# Patient Record
Sex: MALE | Race: WHITE | NOT HISPANIC OR LATINO | Employment: OTHER | URBAN - METROPOLITAN AREA
[De-identification: names, ages, dates, MRNs, and addresses within clinical notes are randomized per-mention and may not be internally consistent; named-entity substitution may affect disease eponyms.]

---

## 2021-04-06 ENCOUNTER — TELEPHONE (OUTPATIENT)
Dept: OTHER | Facility: OTHER | Age: 71
End: 2021-04-06

## 2021-04-08 ENCOUNTER — NURSING HOME VISIT (OUTPATIENT)
Dept: GERIATRICS | Facility: OTHER | Age: 71
End: 2021-04-08
Payer: COMMERCIAL

## 2021-04-08 DIAGNOSIS — R13.12 OROPHARYNGEAL DYSPHAGIA: ICD-10-CM

## 2021-04-08 DIAGNOSIS — N17.9 AKI (ACUTE KIDNEY INJURY) (HCC): ICD-10-CM

## 2021-04-08 DIAGNOSIS — E03.9 ACQUIRED HYPOTHYROIDISM: ICD-10-CM

## 2021-04-08 DIAGNOSIS — R29.6 RECURRENT FALLS: ICD-10-CM

## 2021-04-08 DIAGNOSIS — N13.8 BENIGN PROSTATIC HYPERPLASIA WITH URINARY OBSTRUCTION: ICD-10-CM

## 2021-04-08 DIAGNOSIS — R53.81 DEBILITY: ICD-10-CM

## 2021-04-08 DIAGNOSIS — N40.1 BENIGN PROSTATIC HYPERPLASIA WITH URINARY OBSTRUCTION: ICD-10-CM

## 2021-04-08 DIAGNOSIS — G20 PARKINSON'S DISEASE (HCC): ICD-10-CM

## 2021-04-08 DIAGNOSIS — K59.00 CONSTIPATION, UNSPECIFIED CONSTIPATION TYPE: ICD-10-CM

## 2021-04-08 DIAGNOSIS — D62 ACUTE BLOOD LOSS ANEMIA: ICD-10-CM

## 2021-04-08 DIAGNOSIS — S72.141D CLOSED DISPLACED INTERTROCHANTERIC FRACTURE OF RIGHT FEMUR WITH ROUTINE HEALING, SUBSEQUENT ENCOUNTER: Primary | ICD-10-CM

## 2021-04-08 PROCEDURE — 99306 1ST NF CARE HIGH MDM 50: CPT | Performed by: FAMILY MEDICINE

## 2021-04-08 NOTE — PROGRESS NOTES
[facility]   [address] [facility], [facility], [address]  History and Physical  POS: SNF-31    Records Reviewed include: [facility] records  Unable to obtain from patient due to: History obtained from patient; additional history obtained speaking with nursing/nursing note review along with medical record review    Chief Complaint/ Reason for Admission: Right femur fracture s/p surgical repair, PATTI, Parkinson disease, ABLA    History of Present Illness:            65year old male admitted for SNF rehab following hospitalization at [facility], [facility]  Presented following a fall with subsequent right femur fracture; underwent surgical repair per orthopedics on 3/28/21 (TFN)  Postoperative course significant for acute blood loss anemia requiring PRBC transfusion along with PATTI- in setting of urinary retention due to obstructive uropathy secondary to BPH; mckeon placed inpatient, patient is on tamsulosin  Had CKD2 with baseline creatinine of 0 9-1 1; see lab trends below  Continues with hip pain, exacerbated with ambulation and movement; relieved with rest and PRN acetaminophen  Additional active medical issues include Parkinson disease, on multiple medications (see updated med list in Epic), with associated dysphagia        Allergies  No Known Allergies    Past Medical History  Past Medical History:   Diagnosis Date    Arthritis     Disease of thyroid gland     Hyperlipidemia     Hypertension     Parkinson disease ([address] )       CHF baseline EF:N/A  CKD: Stage 2, baseline creatinine 0 9-1 1    Past Surgical History:   Procedure Laterality Date    CHOLECYSTECTOMY      FRACTURE SURGERY      INGUINAL HERNIA REPAIR Bilateral     TONSILLECTOMY      TOTAL KNEE ARTHROPLASTY Left        Family History  Family History   Problem Relation Age of Onset    Breast cancer Mother     Heart disease Mother     Lung cancer Father     Thyroid disease Brother        Social History  Social History Tobacco Use   Smoking Status Never Smoker   Smokeless Tobacco Never Used      Social History     Substance and Sexual Activity   Alcohol Use Not Currently      Social History     Substance and Sexual Activity   Drug Use Not on file        Lives: Home, with spouse,  Social Support: Family  Education Level:  Occupation:  Fall in the past 12 months: Yes, multiple  Use of assistance Device: Walker    Physical Exam    Weight: 152lb Temp:97 9F BP: 116/68(85/43- 125/81) Pulse:84 Resp:16 O2 Sat:91%RA  Constitutional: Well-nourished and Normocephalic  Orientation:Person and Place, situation     Physical Exam  Vitals signs and nursing note reviewed  Constitutional:       General: He is awake  He is not in acute distress  Appearance: He is well-developed and well-groomed  He is not ill-appearing, toxic-appearing or diaphoretic  HENT:      Head: Normocephalic and atraumatic  Right Ear: External ear normal       Left Ear: External ear normal       Nose: No rhinorrhea  Mouth/Throat:      Mouth: Mucous membranes are moist       Pharynx: Oropharynx is clear  Eyes:      General: No scleral icterus  Right eye: No discharge  Left eye: No discharge  Conjunctiva/sclera: Conjunctivae normal    Neck:      Musculoskeletal: Neck supple  No neck rigidity  Cardiovascular:      Rate and Rhythm: Normal rate  Heart sounds: S1 normal and S2 normal    Pulmonary:      Effort: Pulmonary effort is normal  No respiratory distress  Breath sounds: No wheezing  Abdominal:      General: Bowel sounds are normal  There is no distension  Palpations: Abdomen is soft  Tenderness: There is no abdominal tenderness  Genitourinary:     Comments: Mckeon with yellow urine noted in mckeon bag  Musculoskeletal:         General: No deformity  Right lower leg: Edema (trace pedal) present  Left lower leg: Edema (trace pedal) present  Skin:     General: Skin is warm and dry        Coloration: Skin is pale  Skin is not jaundiced  Neurological:      General: No focal deficit present  Mental Status: He is alert  Cranial Nerves: Cranial nerves are intact  Motor: Abnormal muscle tone present  No tremor  Comments: Bradykinesia  Noted to have stuttering with appropriate responses given time to compensate and communicate   Psychiatric:         Attention and Perception: Attention and perception normal          Mood and Affect: Mood and affect normal          Behavior: Behavior is cooperative  Review of Systems:  Review of Systems   Constitutional: Negative for chills and fever  Respiratory: Negative for cough and shortness of breath  Cardiovascular: Negative for leg swelling  Gastrointestinal: Negative for abdominal pain  Genitourinary: Negative for hematuria  Musculoskeletal: Positive for arthralgias  Neurological: Negative for dizziness and light-headedness  All other systems reviewed and are negative        List of Current Medications: Medication list reviewed and updated in Epic to reflect most current SNF orders    Allergies  No Known Allergies    Labs/Diagnostics (reviewed by this provider): Hospital Paperwork and Old Records  CBC: Hb:9 0 Hct:25 8 WBC:3 6 PLT:201  CMP: Na:140 K:4 3 Cl:105 CO:30 BUN:26 Cr:1 00 (1 71) Glu:94 Ca:8 5   AST:45 ALT:18 Alk-P:76 Tprotein:6 2 Albumin:3 0   Tbili:0 7 Ma 6 Phos:4 3  Cardiac: Troponin:<0 02    Microbiology:  Urine Cx: (3/30/21)- no growth    Imaging Reviewed:  EKG: (3/28/21)- sinus rhythm with PACs  CT Scan: Head/Cervical spine (3/27/21)- advanced cervical degenerative changes; no fracture; no acute intracranial pathology  Other: Ultrasound b/l kidneys (3/29/21)- no hydronephrosis; right renal midpole and lower pole cysts  Xray R femur (3/27/21)- comminuted intertrochanteric fracture of right proximal femur    Assessment/Plan:  65year old male with:    Closed intertrochanteric fracture of right femur with routine healing  S/p TFN on 3/28/21  In setting of immobility, restart lovenox for DVT ppx 40mg SQ daily through 4/26  Start scheduled acetaminophen 650mg TID for pain; max 3g/24h  Consider topical agent, OxyIR 2 5mg Q6h PRN severe pain or prior to therapy if pain not well controlled on max scheduled acetaminophen  Follow up with orthopedics as scheduled    PATTI (acute [facility]) ([facility] )  In setting of obstructive uropathy, along with ABLA in perioperative setting- management of both as outlined  D/c naproxen; avoid NSAIDS, nephrotoxic agents  Start daily weights/PATTI pathway  BMP ordered for 4/9    Acute blood loss anemia  In setting of right femur fracture s/p surgical repair  CBC ordered for 4/9    Benign prostatic hyperplasia with urinary obstruction  Yun placed in patient, Dx: obstructive uropathy  Continue tamsulosin- monitor BP closely, avoid hypotension  Follow up with urology- needs appointment scheduled routine outpatient  Plan for voiding trial when mobility increased, constipation treated    Parkinson's disease ([facility] )  Continue rytary, amantadine, ropinirole, exelon patch (with associated cognitive impairment)  Follow up with neurology routine outpatient    Oropharyngeal dysphagia  In setting of Parkinson disease  Modified diet  Aspiration precautions  Speech therapy consult    Acquired hypothyroidism  Continue levothyroxine    Constipation  In setting of immobility  Ensure adequate hydration  Continue senna-docusate along with miralax; hold for loose stool  Constipation may be contributing to urinary retention, see above    Recurrent falls       Debility  Multifactorial  Admit to SNF for rehab  PT/OT consult- evaluate and treat  Supportive care, nutritional support, ADL support  Fall precautions  Management of acute and chronic medical conditions as outlined    Pain: Present yes- right hip with movement; see HPI  Rehab Potential:Fair  Patient Informed of Medical Condition: yes  Patient is Capable of Understanding Their Right: yes  Prognosis:Good  Discharge Plan: STR-> Home w/wife  Surrogate Decision Maker: Wife  Advanced Directives: Yes  Code status:DNR (Per discussion with resident or POA)  Code status order updated to DNR/DNI while at SNF    PCP: Robson Erickson DO    Immunization History   Administered Date(s) Administered    INFLUENZA 10/01/2012, 10/14/2013, 02/03/2015, 12/07/2015, 01/05/2016, 09/15/2020    Influenza Split High Dose Preservative Free IM 10/12/2016, 10/24/2017, 10/26/2018, 10/29/2019    Pneumococcal Conjugate 13-Valent 10/12/2016    Pneumococcal Polysaccharide PPV23 10/14/2013    SARS-CoV-2 / COVID-19 mRNA IM (Moderna) 03/10/2021    Td (adult), Unspecified 01/19/2000    Tdap 02/03/2015    Zoster 11/06/2017    influenza, trivalent, adjuvanted 09/15/2020       Pam Penaloza DO  4/8/21

## 2021-04-09 PROBLEM — S72.141D CLOSED INTERTROCHANTERIC FRACTURE OF RIGHT FEMUR WITH ROUTINE HEALING: Status: ACTIVE | Noted: 2021-03-27

## 2021-04-09 PROBLEM — R26.2 AMBULATORY DYSFUNCTION: Status: ACTIVE | Noted: 2018-06-11

## 2021-04-09 PROBLEM — E78.2 MIXED HYPERLIPIDEMIA: Status: ACTIVE | Noted: 2021-04-09

## 2021-04-09 PROBLEM — Z96.652 STATUS POST TOTAL LEFT KNEE REPLACEMENT: Status: ACTIVE | Noted: 2018-06-12

## 2021-04-09 PROBLEM — S72.141G: Status: ACTIVE | Noted: 2021-03-27

## 2021-04-09 PROBLEM — K59.00 CONSTIPATION: Status: ACTIVE | Noted: 2021-04-09

## 2021-04-09 PROBLEM — R29.6 FALLS FREQUENTLY: Status: ACTIVE | Noted: 2020-06-10

## 2021-04-09 PROBLEM — H90.6 MIXED CONDUCTIVE AND SENSORINEURAL HEARING LOSS OF BOTH EARS: Status: ACTIVE | Noted: 2018-10-26

## 2021-04-09 PROBLEM — G31.84 MCI (MILD COGNITIVE IMPAIRMENT) WITH MEMORY LOSS: Status: ACTIVE | Noted: 2018-10-09

## 2021-04-09 PROBLEM — R53.81 DEBILITY: Status: ACTIVE | Noted: 2021-04-09

## 2021-04-09 PROBLEM — D62 ACUTE BLOOD LOSS ANEMIA: Status: ACTIVE | Noted: 2021-04-09

## 2021-04-09 PROBLEM — N17.9 AKI (ACUTE KIDNEY INJURY) (HCC): Status: ACTIVE | Noted: 2021-04-09

## 2021-04-09 RX ORDER — RIVASTIGMINE 4.6 MG/24H
1 PATCH, EXTENDED RELEASE TRANSDERMAL DAILY
COMMUNITY
Start: 2020-10-07 | End: 2021-10-07

## 2021-04-09 RX ORDER — LEVODOPA AND CARBIDOPA 145; 36.25 MG/1; MG/1
3 CAPSULE, EXTENDED RELEASE ORAL 4 TIMES DAILY
COMMUNITY
Start: 2021-01-26

## 2021-04-09 RX ORDER — TAMSULOSIN HYDROCHLORIDE 0.4 MG/1
0.8 CAPSULE ORAL EVERY EVENING
COMMUNITY
Start: 2021-04-05

## 2021-04-09 RX ORDER — AMANTADINE HYDROCHLORIDE 100 MG/1
100 CAPSULE, GELATIN COATED ORAL DAILY
COMMUNITY
Start: 2021-01-26

## 2021-04-09 RX ORDER — POLYETHYLENE GLYCOL 3350 17 G/17G
17 POWDER, FOR SOLUTION ORAL DAILY PRN
COMMUNITY
Start: 2021-04-07 | End: 2025-04-09

## 2021-04-09 RX ORDER — SENNOSIDES 8.6 MG
650 CAPSULE ORAL 3 TIMES DAILY
COMMUNITY

## 2021-04-09 RX ORDER — ASPIRIN 81 MG/1
81 TABLET ORAL DAILY
COMMUNITY
End: 2021-04-23

## 2021-04-09 RX ORDER — AMOXICILLIN 250 MG
1 CAPSULE ORAL 2 TIMES DAILY
COMMUNITY
Start: 2021-04-06 | End: 2021-04-20 | Stop reason: ALTCHOICE

## 2021-04-09 RX ORDER — LEVOTHYROXINE SODIUM 0.15 MG/1
150 TABLET ORAL DAILY
COMMUNITY
Start: 2021-04-07 | End: 2022-04-07

## 2021-04-09 RX ORDER — ROPINIROLE 4 MG/1
4 TABLET, FILM COATED ORAL 3 TIMES DAILY
COMMUNITY
Start: 2021-04-06 | End: 2022-04-06

## 2021-04-09 RX ORDER — PRAVASTATIN SODIUM 20 MG
20 TABLET ORAL DAILY
COMMUNITY

## 2021-04-09 NOTE — ASSESSMENT & PLAN NOTE
Yun placed in patient, Dx: obstructive uropathy  Continue tamsulosin- monitor BP closely, avoid hypotension  Follow up with urology- needs appointment scheduled routine outpatient  Plan for voiding trial when mobility increased, constipation treated 78

## 2021-04-09 NOTE — ASSESSMENT & PLAN NOTE
S/p TFN on 3/28/21  In setting of immobility, restart lovenox for DVT ppx 40mg SQ daily through 4/26  Start scheduled acetaminophen 650mg TID for pain; max 3g/24h  Consider topical agent, OxyIR 2 5mg Q6h PRN severe pain or prior to therapy if pain not well controlled on max scheduled acetaminophen  Follow up with orthopedics as scheduled

## 2021-04-09 NOTE — ASSESSMENT & PLAN NOTE
Continue rytary, amantadine, ropinirole, exelon patch (with associated cognitive impairment)  Follow up with neurology routine outpatient

## 2021-04-09 NOTE — ASSESSMENT & PLAN NOTE
In setting of obstructive uropathy, along with ABLA in perioperative setting- management of both as outlined  D/c naproxen; avoid NSAIDS, nephrotoxic agents  Start daily weights/PATTI pathway  BMP ordered for 4/9

## 2021-04-09 NOTE — ASSESSMENT & PLAN NOTE
In setting of immobility  Ensure adequate hydration  Continue senna-docusate along with miralax; hold for loose stool  Constipation may be contributing to urinary retention, see above

## 2021-04-12 ENCOUNTER — NURSING HOME VISIT (OUTPATIENT)
Dept: GERIATRICS | Facility: OTHER | Age: 71
End: 2021-04-12
Payer: COMMERCIAL

## 2021-04-12 DIAGNOSIS — N17.9 AKI (ACUTE KIDNEY INJURY) (HCC): ICD-10-CM

## 2021-04-12 DIAGNOSIS — N13.8 BENIGN PROSTATIC HYPERPLASIA WITH URINARY OBSTRUCTION: ICD-10-CM

## 2021-04-12 DIAGNOSIS — K59.00 CONSTIPATION, UNSPECIFIED CONSTIPATION TYPE: ICD-10-CM

## 2021-04-12 DIAGNOSIS — S72.141D CLOSED DISPLACED INTERTROCHANTERIC FRACTURE OF RIGHT FEMUR WITH ROUTINE HEALING, SUBSEQUENT ENCOUNTER: Primary | ICD-10-CM

## 2021-04-12 DIAGNOSIS — R53.81 DEBILITY: ICD-10-CM

## 2021-04-12 DIAGNOSIS — N40.1 BENIGN PROSTATIC HYPERPLASIA WITH URINARY OBSTRUCTION: ICD-10-CM

## 2021-04-12 DIAGNOSIS — D62 ACUTE BLOOD LOSS ANEMIA: ICD-10-CM

## 2021-04-12 DIAGNOSIS — G20 PARKINSON'S DISEASE (HCC): ICD-10-CM

## 2021-04-12 PROCEDURE — 99309 SBSQ NF CARE MODERATE MDM 30: CPT | Performed by: NURSE PRACTITIONER

## 2021-04-12 NOTE — ASSESSMENT & PLAN NOTE
-s/p surgical repair on 03/28/2021  -continue Lovenox for DVT prophylaxis through 04/26/2021  -no signs of infection   -continue Addy supplement for wound healing  -continue scheduled acetaminophen which has been controlling pain per patient  -continue PT/OT  -follow-up with orthopedics tomorrow 04/13/2021

## 2021-04-12 NOTE — PROGRESS NOTES
[facility]   [address] [place] [address], [place], [address]  POS: 31 (STR)  Progress Note    Chief Complaint/Reason for visit: STR follow up  History obtained from patient, nursing staff, and EMR  History of Present Illness:  68-year-old male seen and examined for STR follow up  Found patient resting in bed and appeared in no distress  Denies having pain or discomfort at time of visit  Tends to extend right great toe up toward body but able to relax the foot when asked to  No reports of choking episodes  Patient makes a conscious effort to take his time while eating so he does not choke  Past Medical History: unchanged from history and physical  Past Medical History:   Diagnosis Date    Arthritis     Disease of thyroid gland     Hyperlipidemia     Hypertension     Parkinson disease ([facility] )      Family History: unchanged from history and physical  Social History: unchanged from history and physical  Resident Since:  04/06/2021  Review of systems: Review of Systems   HENT: Positive for trouble swallowing  Patient states that he takes his time chewing and swallowing due to Parkinson's   Eyes: Negative  Respiratory: Negative  Negative for cough and shortness of breath  Cardiovascular: Negative  Gastrointestinal: Negative  Endocrine: Negative  Genitourinary: Negative  Musculoskeletal: Positive for gait problem  Neurological: Positive for speech difficulty  Negative for dizziness, syncope, light-headedness, numbness and headaches  Psychiatric/Behavioral: Negative for agitation, behavioral problems and suicidal ideas  The patient is not nervous/anxious  All other systems reviewed and are negative  Medications:   All medication orders were reviewed  Allergies: NKDA  Consults reviewed:PT, OT, Speech and Other  Labs/Diagnostics (reviewed by this provider): Copy in Chart  Routine screening for COVID-19 study negative on 04/10/2021  CBC with diff and BMP reviewed from 04/09/2021  Hemoglobin: 9 8   hematocrit:  28 0    platelet count:  558    WBC: 5 9  Glucose: 74    BUN: 36    creatinine: 1 13    sodium:  139    potassium: 4 9    chloride: 106    carbon dioxide: 25    calcium: 8 8    GFR: 65  Imaging Reviewed:  None today    Physical Exam  All vital signs were reviewed  Weight:  146 6 lb Temp:  98 1 BP:  96/63 Pulse:  74 Resp:  16  O2 Sat:  96% on room air  Constitutional: Normocephalic  Orientation:Person, Place and Day     Physical Exam  Vitals signs and nursing note reviewed  Constitutional:       General: He is not in acute distress  Appearance: He is not toxic-appearing or diaphoretic  Comments: Elderly male who appears with chronic illness  HENT:      Head: Normocephalic  Nose: No congestion or rhinorrhea  Mouth/Throat:      Mouth: Mucous membranes are moist       Pharynx: No oropharyngeal exudate  Eyes:      General: No scleral icterus  Right eye: No discharge  Left eye: No discharge  Extraocular Movements: Extraocular movements intact  Conjunctiva/sclera: Conjunctivae normal       Pupils: Pupils are equal, round, and reactive to light  Neck:      Musculoskeletal: Neck supple  No neck rigidity  Cardiovascular:      Rate and Rhythm: Normal rate and regular rhythm  Pulses: Normal pulses  Pulmonary:      Effort: Pulmonary effort is normal  No respiratory distress  Breath sounds: Normal breath sounds  No wheezing, rhonchi or rales  Abdominal:      General: Bowel sounds are normal  There is no distension  Palpations: Abdomen is soft  Tenderness: There is no abdominal tenderness  There is no guarding  Genitourinary:     Comments: Indwelling urinary catheter intact and patent for dark yellow urine  Musculoskeletal:      Right lower leg: No edema  Left lower leg: No edema  Comments: Moves all 4 extremities with bradykinesia  Lymphadenopathy:      Cervical: No cervical adenopathy     Skin:     General: Skin is warm and dry  Capillary Refill: Capillary refill takes less than 2 seconds  Comments: Stage III sacral ulcer as documented by nursing  Right hip incision  Neurological:      Mental Status: He is alert  Mental status is at baseline  Motor: Weakness present  Gait: Gait abnormal       Comments: With speech difficulty in setting of Parkinson's   Psychiatric:         Mood and Affect: Mood normal          Behavior: Behavior normal          Thought Content: Thought content normal        Assessment/Plan:  75-year-old male with:    Closed intertrochanteric fracture of right femur with routine healing  -s/p surgical repair on 03/28/2021  -continue Lovenox for DVT prophylaxis through 04/26/2021  -no signs of infection   -continue Addy supplement for wound healing  -continue scheduled acetaminophen which has been controlling pain per patient  -continue PT/OT  -follow-up with orthopedics tomorrow 04/13/2021    PATTI (acute kidney injury) ([facility] [address] )  -in setting of obstructive uropathy, acute blood loss anemia  -baseline creatinine 0 9-1 1  -Naprosyn has been discontinued  -avoid NSAIDs and other nephrotoxins  -ensure adequate hydration  -most recent creatinine 1 13 on 04/09/2021    Acute blood loss anemia  -most recent hemoglobin 9 8/hematocrit 28 0 on 04/09/2021  -recheck CBC on 04/16/2021    Benign prostatic hyperplasia with urinary obstruction  -indwelling urinary catheter placed inpatient due to obstructive uropathy  -continue tamsulosin as ordered and monitor for hypotension   SBPs trending 90s to 100s  -plan for voiding trial when mobility increases and constipation controlled    Parkinson's disease ([facility] )  -requiring assist with ADLs  -continue rytary, amantadine, ropinirole, Exelon patch  -follow-up with Neurology    Constipation  -in setting of immobility  -continue senna S, MiraLax; hold for loose stool    Debility  -multifactorial  -continue supportive care at SNF for ADLs  -continue PT/OT/ST  -continue fall precautions  -ensure adequate hydration and nutrition    **Please note: This follow-up note was constructed using a voice recognition system  [address] [place] [address] [facility]  [id number] PM

## 2021-04-12 NOTE — ASSESSMENT & PLAN NOTE
-in setting of obstructive uropathy, acute blood loss anemia  -baseline creatinine 0 9-1 1  -Naprosyn has been discontinued  -avoid NSAIDs and other nephrotoxins  -ensure adequate hydration  -most recent creatinine 1 13 on 04/09/2021

## 2021-04-12 NOTE — ASSESSMENT & PLAN NOTE
-requiring assist with ADLs  -continue rytary, amantadine, ropinirole, Exelon patch  -follow-up with Neurology

## 2021-04-12 NOTE — ASSESSMENT & PLAN NOTE
-multifactorial  -continue supportive care at SNF for ADLs  -continue PT/OT/ST  -continue fall precautions  -ensure adequate hydration and nutrition

## 2021-04-12 NOTE — ASSESSMENT & PLAN NOTE
-indwelling urinary catheter placed inpatient due to obstructive uropathy  -continue tamsulosin as ordered and monitor for hypotension   SBPs trending 90s to 100s  -plan for voiding trial when mobility increases and constipation controlled

## 2021-04-16 ENCOUNTER — NURSING HOME VISIT (OUTPATIENT)
Dept: GERIATRICS | Facility: OTHER | Age: 71
End: 2021-04-16
Payer: COMMERCIAL

## 2021-04-16 DIAGNOSIS — R13.12 OROPHARYNGEAL DYSPHAGIA: ICD-10-CM

## 2021-04-16 DIAGNOSIS — N17.9 AKI (ACUTE KIDNEY INJURY) (HCC): ICD-10-CM

## 2021-04-16 DIAGNOSIS — S72.141D CLOSED DISPLACED INTERTROCHANTERIC FRACTURE OF RIGHT FEMUR WITH ROUTINE HEALING, SUBSEQUENT ENCOUNTER: Primary | ICD-10-CM

## 2021-04-16 DIAGNOSIS — D62 ACUTE BLOOD LOSS ANEMIA: ICD-10-CM

## 2021-04-16 DIAGNOSIS — G20 PARKINSON'S DISEASE (HCC): ICD-10-CM

## 2021-04-16 PROCEDURE — 99309 SBSQ NF CARE MODERATE MDM 30: CPT | Performed by: NURSE PRACTITIONER

## 2021-04-16 NOTE — ASSESSMENT & PLAN NOTE
-s/p right TFN on 03/28/2021 with orthopedic  -had follow-up with [facility] orthopedic surgeon on 04/13/2021  Patient is to continue 50% weight-bearing right lower extremity, transition from Lovenox to aspirin 81 mg b i d   -orthopedic requested x-ray of right hip/pelvis and have results faxed to their office as they were unable to do it in the office during office visit per record review    Will check for results  -continue PT/OT  -continue Addy supplement for wound healing  -pain controlled with scheduled acetaminophen  -follow-up with orthopedics as scheduled Tesha 10, 2021

## 2021-04-16 NOTE — ASSESSMENT & PLAN NOTE
-in setting of Parkinson's disease  -tolerating modified diet  -ensure supplement added by dietitian  -continue aspiration precautions  -speech therapy following

## 2021-04-16 NOTE — ASSESSMENT & PLAN NOTE
-continue supportive care at SNF  -continue rytary, amantadine, ropinirole, Exelon patch  -followup with Neurology  -monitor for orthostatic hypotension

## 2021-04-16 NOTE — PROGRESS NOTES
[address], [place], [address]  POS: 31 (STR)  Progress Note    Chief Complaint/Reason for visit: STR follow up  History obtained from patient, nursing staff, and EMR  History of Present Illness:  66-year-old male seen and examined for STR follow up  Seated out of bed and appeared in no distress  Patient requires assistance at mealtime to set up food for him but he is able to feed himself  Denies having any pain or discomfort  Denies shortness of breath, chest pain, headache, dizziness, abdominal pain, nausea, vomiting, diarrhea, or constipation  Past Medical History: unchanged from history and physical  Past Medical History:   Diagnosis Date    Arthritis     Disease of thyroid gland     Hyperlipidemia     Hypertension     Parkinson disease ([facility] [address] )      Family History: unchanged from history and physical  Social History: unchanged from history and physical  Resident Since:  04/06/2021  Review of systems: Review of Systems   Constitutional: Negative for appetite change, chills, diaphoresis and fatigue  HENT: Negative  Eyes: Negative  Respiratory: Negative  Negative for cough and shortness of breath  Cardiovascular: Negative  Endocrine: Negative  Genitourinary: Negative  Musculoskeletal: Positive for gait problem  Skin: Positive for wound  Neurological: Positive for speech difficulty  Negative for dizziness, syncope, light-headedness, numbness and headaches  Psychiatric/Behavioral: Negative  All other systems reviewed and are negative  Medications:   All medication orders were reviewed  Allergies: NKDA  Consults reviewed:PT, OT and Other  Labs/Diagnostics (reviewed by this provider): Copy in Chart    Imaging Reviewed:  None today    Physical Exam    Weight:  144 lb Temp:  97 3 BP:  99/55  Pulse:  83 Resp: 16 O2 Sat:  92% on room air  Constitutional: Normocephalic  Orientation:Person, Place, Day and Date     Physical Exam  Vitals signs and nursing note reviewed  Constitutional:       General: He is not in acute distress  Appearance: He is not toxic-appearing or diaphoretic  Comments: Elderly male who appears older than stated age and with chronic illness   HENT:      Head: Normocephalic  Nose: No congestion or rhinorrhea  Mouth/Throat:      Mouth: Mucous membranes are moist       Pharynx: No oropharyngeal exudate  Eyes:      General: No scleral icterus  Right eye: No discharge  Left eye: No discharge  Extraocular Movements: Extraocular movements intact  Conjunctiva/sclera: Conjunctivae normal       Pupils: Pupils are equal, round, and reactive to light  Neck:      Musculoskeletal: Neck supple  No neck rigidity  Cardiovascular:      Rate and Rhythm: Normal rate and regular rhythm  Pulses: Normal pulses  Pulmonary:      Effort: Pulmonary effort is normal  No respiratory distress  Breath sounds: Normal breath sounds  No wheezing, rhonchi or rales  Abdominal:      General: Bowel sounds are normal  There is no distension  Palpations: Abdomen is soft  Tenderness: There is no abdominal tenderness  There is no guarding  Musculoskeletal:      Right lower leg: No edema  Left lower leg: No edema  Comments: Moves all 4 extremities with generalized weakness  Lymphadenopathy:      Cervical: No cervical adenopathy  Skin:     General: Skin is warm and dry  Capillary Refill: Capillary refill takes less than 2 seconds  Comments: Right hip incision line intact without signs of infection   Neurological:      Mental Status: He is alert  Mental status is at baseline  Motor: Weakness present  Gait: Gait abnormal       Comments: Bradykinesia with impaired posture imbalance  Speech with stutter   Psychiatric:         Mood and Affect: Mood normal          Behavior: Behavior normal          Thought Content:  Thought content normal        Assessment/Plan:  68-year-old male with:    Closed intertrochanteric fracture of right femur with routine healing  -s/p right TFN on 03/28/2021 with orthopedic  -had follow-up with [facility] orthopedic surgeon on 04/13/2021  Patient is to continue 50% weight-bearing right lower extremity, transition from Lovenox to aspirin 81 mg b i d   -orthopedic requested x-ray of right hip/pelvis and have results faxed to their office as they were unable to do it in the office during office visit per record review  Will check for results  -continue PT/OT  -continue Addy supplement for wound healing  -pain controlled with scheduled acetaminophen  -follow-up with orthopedics as scheduled Tesha 10, 2021    PATTI (acute kidney injury) ([facility] [address] )  -in setting of obstructive uropathy, acute blood loss anemia  -baseline creatinine 0 9-1 1  Naprosyn has been discontinued  Avoid NSAIDs and other nephrotoxins  -ensure adequate hydration  -most recent creatinine 1 13 on 04/09  -monitor BMP    Acute blood loss anemia  -hemoglobin up trending with most recent hemoglobin 10 5 today    Oropharyngeal dysphagia  -in setting of Parkinson's disease  -tolerating modified diet  -ensure supplement added by dietitian  -continue aspiration precautions  -speech therapy following    Parkinson's disease ([facility] [address] )  -continue supportive care at SNF  -continue rytary, amantadine, ropinirole, Exelon patch  -followup with Neurology  -monitor for orthostatic hypotension    **Please note: This follow-up note was constructed using a voice recognition system  [address] [place] [facility]  1/65/05136:72 PM

## 2021-04-16 NOTE — ASSESSMENT & PLAN NOTE
-in setting of obstructive uropathy, acute blood loss anemia  -baseline creatinine 0 9-1 1  Naprosyn has been discontinued    Avoid NSAIDs and other nephrotoxins  -ensure adequate hydration  -most recent creatinine 1 13 on 04/09  -monitor BMP

## 2021-04-20 ENCOUNTER — NURSING HOME VISIT (OUTPATIENT)
Dept: GERIATRICS | Facility: OTHER | Age: 71
End: 2021-04-20
Payer: COMMERCIAL

## 2021-04-20 DIAGNOSIS — L89.153 SACRAL DECUBITUS ULCER, STAGE III (HCC): ICD-10-CM

## 2021-04-20 DIAGNOSIS — K59.00 CONSTIPATION, UNSPECIFIED CONSTIPATION TYPE: ICD-10-CM

## 2021-04-20 DIAGNOSIS — R63.4 UNINTENTIONAL WEIGHT LOSS: ICD-10-CM

## 2021-04-20 DIAGNOSIS — G20 PARKINSON'S DISEASE (HCC): ICD-10-CM

## 2021-04-20 DIAGNOSIS — S72.141D CLOSED DISPLACED INTERTROCHANTERIC FRACTURE OF RIGHT FEMUR WITH ROUTINE HEALING, SUBSEQUENT ENCOUNTER: Primary | ICD-10-CM

## 2021-04-20 PROCEDURE — 99309 SBSQ NF CARE MODERATE MDM 30: CPT | Performed by: NURSE PRACTITIONER

## 2021-04-20 NOTE — ASSESSMENT & PLAN NOTE
-weight loss of 2 6 lb noted since 04/11/2021 which may be due to multiple factors including Parkinson's, recent surgery, stage III sacral ulcer, dysphagia  -continue Addy for wound healing  -continue Ensure Plus b i d  with breakfast and dinner  -dietitian following  -will monitor closely

## 2021-04-20 NOTE — ASSESSMENT & PLAN NOTE
-with early Parkinson disease related dementia, gait instability, frequent falls, dysphagia, speech stuttering, bilateral upper extremity resting tremors   -continue supportive care  -continue rytary, amantadine, ropinirole, exelon patch  -monitor for orthostatic hypotension  -follow-up with Neurology

## 2021-04-20 NOTE — PROGRESS NOTES
[facility]   [address] [facility], [place], [address]  POS: 31 (STR)  Progress Note    Chief Complaint/Reason for visit: STR follow up  History of Present Illness:  47-year-old male seen and examined for STR follow up  Seated in chair and appeared in no distress  Denies pain or discomfort  C/o loose stools on stool softeners and requesting all stool softeners be discontinued  Senna S was discontinued and MiraLax change to p r n at patient's request   Patient has lost 2 6 lb since 04/11/2021  Currently on Ensure Plus b i d  with breakfast and dinner and also Addy b i d  for wound healing  Past Medical History: unchanged from history and physical  Past Medical History:   Diagnosis Date    Arthritis     Disease of thyroid gland     Hyperlipidemia     Hypertension     Parkinson disease ([facility] [address] )      Family History: unchanged from history and physical  Social History: unchanged from history and physical  Resident Since:  04/06/2021  Review of systems: Review of Systems   HENT: Negative  Eyes: Negative  Respiratory: Negative  Negative for cough and shortness of breath  Cardiovascular: Negative  Gastrointestinal: Positive for diarrhea  Endocrine: Negative  Genitourinary:        Indwelling urinary catheter   Musculoskeletal: Positive for gait problem  Muscle weakness   Skin: Positive for wound  Neurological: Positive for speech difficulty  Negative for dizziness, syncope, light-headedness, numbness and headaches  Psychiatric/Behavioral: Negative for agitation, behavioral problems, dysphoric mood, hallucinations and sleep disturbance  The patient is not nervous/anxious  All other systems reviewed and are negative  Medications: All medications were reviewed  Allergies: Reviewed and unchanged  Consults reviewed: Other  Labs/Diagnostics (reviewed by this provider): Copy in Chart    Imaging Reviewed:  None today    Physical Exam    Weight:  142 8 lb Temp:  97 2 BP:  113/75  Pulse: 86 Resp:  18 O2 Sat:  96% on room air  Constitutional: Normocephalic  Orientation:Person, Place, Day and Date     Physical Exam  Vitals signs and nursing note reviewed  Constitutional:       General: He is not in acute distress  Appearance: He is not toxic-appearing or diaphoretic  Comments: Elderly male who appears with chronic illness  In no distress  HENT:      Head: Normocephalic  Nose: No congestion or rhinorrhea  Mouth/Throat:      Mouth: Mucous membranes are moist       Pharynx: No oropharyngeal exudate  Eyes:      General: No scleral icterus  Right eye: No discharge  Left eye: No discharge  Extraocular Movements: Extraocular movements intact  Conjunctiva/sclera: Conjunctivae normal       Pupils: Pupils are equal, round, and reactive to light  Neck:      Musculoskeletal: Neck supple  No neck rigidity  Cardiovascular:      Rate and Rhythm: Normal rate and regular rhythm  Pulses: Normal pulses  Pulmonary:      Effort: Pulmonary effort is normal  No respiratory distress  Breath sounds: Normal breath sounds  No wheezing, rhonchi or rales  Abdominal:      General: Bowel sounds are normal  There is no distension  Palpations: Abdomen is soft  Tenderness: There is no abdominal tenderness  There is no guarding  Genitourinary:     Comments: Indwelling urinary catheter intact and patent  Musculoskeletal:      Right lower leg: No edema  Left lower leg: No edema  Comments: Moves all 4 extremities with muscle weakness in setting of Parkinson's  Has resting tremors of bilateral upper extremities  Bradykinesia  Lymphadenopathy:      Cervical: No cervical adenopathy  Skin:     General: Skin is warm and dry  Capillary Refill: Capillary refill takes less than 2 seconds  Comments: Stage III sacral pressure ulcer  Neurological:      Mental Status: He is alert  Mental status is at baseline  Motor: Weakness present  Gait: Gait abnormal       Comments: Speech stuttering  Psychiatric:         Mood and Affect: Mood normal          Behavior: Behavior normal          Thought Content: Thought content normal        Assessment/Plan:  64-year-old male with:    Closed intertrochanteric fracture of right femur with routine healing  -s/p right TNF surgery on 03/28/2021 with orthopedic  Had follow-up with [facility] orthopedic on 04/13/2021  -continue 50% WB RLE  -continue aspirin 81 mg b i d   -right hip x-ray on 04/13/2021 revealed no acute fracture, dislocation or destructive bony process  No soft tissue abnormality  S/p right hip screw nail fixation  Osteopenia  Mild degenerative changes  Bilateral pubic region surgical clips  No acute osseous abnormality  -continue PT/OT    Constipation  -with loose stools on stool softeners  -discontinue senna S as per patient request  -MiraLax daily p r n   -monitor closely as patient is at risk for constipation due to immobility    Parkinson's disease ([facility] [address] )  -with early Parkinson disease related dementia, gait instability, frequent falls, dysphagia, speech stuttering, bilateral upper extremity resting tremors   -continue supportive care  -continue rytary, amantadine, ropinirole, exelon patch  -monitor for orthostatic hypotension  -follow-up with Neurology    Benign prostatic hyperplasia with urinary obstruction  -indwelling urinary catheter placed inpatient due to obstructive uropathy    Nursing obtained orders from [facility] urology to change indwelling urinary catheter monthly and p r n  along with p r n  flush orders  -continue tamsulosin and monitor for hypotension    Sacral decubitus ulcer, stage III ([facility] [address] )  -continue sacral ulcer dressing with alginate/hydrocolloid dressing every Monday, Wednesday, and Friday  -no signs of infection reported  -progress followed by wound team    Unintentional weight loss  -weight loss of 2 6 lb noted since 04/11/2021 which may be due to multiple factors including Parkinson's, recent surgery, stage III sacral ulcer, dysphagia  -continue Addy for wound healing  -continue Ensure Plus b i d  with breakfast and dinner  -dietitian following  -will monitor closely    **Please note: This follow-up note was constructed using a voice recognition system  [address] [place] [facility]  3/64/37376:20 PM

## 2021-04-20 NOTE — ASSESSMENT & PLAN NOTE
-indwelling urinary catheter placed inpatient due to obstructive uropathy    Nursing obtained orders from [facility] urology to change indwelling urinary catheter monthly and p r n  along with p r n  flush orders  -continue tamsulosin and monitor for hypotension

## 2021-04-20 NOTE — ASSESSMENT & PLAN NOTE
-with loose stools on stool softeners  -discontinue senna S as per patient request  -MiraLax daily p r n   -monitor closely as patient is at risk for constipation due to immobility

## 2021-04-20 NOTE — ASSESSMENT & PLAN NOTE
-continue sacral ulcer dressing with alginate/hydrocolloid dressing every Monday, Wednesday, and Friday  -no signs of infection reported  -progress followed by wound team

## 2021-04-23 ENCOUNTER — NURSING HOME VISIT (OUTPATIENT)
Dept: GERIATRICS | Facility: OTHER | Age: 71
End: 2021-04-23
Payer: COMMERCIAL

## 2021-04-23 DIAGNOSIS — S72.141D CLOSED DISPLACED INTERTROCHANTERIC FRACTURE OF RIGHT FEMUR WITH ROUTINE HEALING, SUBSEQUENT ENCOUNTER: Primary | ICD-10-CM

## 2021-04-23 DIAGNOSIS — G20 PARKINSON'S DISEASE (HCC): ICD-10-CM

## 2021-04-23 DIAGNOSIS — N17.9 AKI (ACUTE KIDNEY INJURY) (HCC): ICD-10-CM

## 2021-04-23 DIAGNOSIS — N13.8 BENIGN PROSTATIC HYPERPLASIA WITH URINARY OBSTRUCTION: ICD-10-CM

## 2021-04-23 DIAGNOSIS — D62 ACUTE BLOOD LOSS ANEMIA: ICD-10-CM

## 2021-04-23 DIAGNOSIS — N40.1 BENIGN PROSTATIC HYPERPLASIA WITH URINARY OBSTRUCTION: ICD-10-CM

## 2021-04-23 PROCEDURE — 99309 SBSQ NF CARE MODERATE MDM 30: CPT | Performed by: NURSE PRACTITIONER

## 2021-04-23 RX ORDER — ASPIRIN 81 MG/1
81 TABLET ORAL 2 TIMES DAILY
Start: 2021-04-23

## 2021-04-23 NOTE — ASSESSMENT & PLAN NOTE
-in setting of obstructive uropathy and acute blood loss anemia  Naprosyn has been discontinued  Continue to avoid NSAIDs and other nephrotoxins  -baseline creatinine 0 9-1 1  -most recent creatinine 1  13    Recheck BMP on 04/26/2021  -patient appears euvolemic  -ensure adequate hydration

## 2021-04-23 NOTE — ASSESSMENT & PLAN NOTE
-s/p TNF surgery on 03/28/2021 with orthopedic  Had follow-up with [facility] orthopedic on 04/13/2021  -continue PT/OT  -continue fall precautions  -continue aspirin 81 mg b i d

## 2021-04-23 NOTE — ASSESSMENT & PLAN NOTE
-indwelling urinary catheter placed inpatient due to obstructive uropathy    Nursing has orders from [facility] Urology to change indwelling urinary catheter monthly and p r n   -continue indwelling urinary catheter care as ordered  -continue tamsulosin   -monitor for hypotension and signs of infection since patient is at risk for CAUTI

## 2021-04-23 NOTE — PROGRESS NOTES
[facility]DR[facility]   [address] [facility], [place], [address]  POS: 31 (STR)  Progress Note    Chief Complaint/Reason for visit: STR follow up  History of Present Illness:  70-year-old male seen and examined for STR follow up  Seated out of bed in chair eating lunch and appeared in no distress  No acute concerns reported by patient or from nursing  Denies pain or discomfort at time of exam   Right hip surgical incision line healed  No signs of infection noted  Most recent CBC with diff and BMP stable  Patient's weight is fluctuating between 143 to 144 lb  He states that his appetite is improving and completing over 50% of meals and taking ensure Plus and Addy supplements  Wound team monitoring sacral decubitus ulcer stage III  Monitor for constipation since patient requested that all stool softeners be discontinued  Past Medical History: unchanged from history and physical  Past Medical History:   Diagnosis Date    Arthritis     Disease of thyroid gland     Hyperlipidemia     Hypertension     Parkinson disease ([address] )      Family History: unchanged from history and physical  Social History: unchanged from history and physical  Resident Since:  04/06/2021  Review of systems: Review of Systems   Constitutional: Negative  HENT: Negative  Eyes: Negative  Respiratory: Negative  Negative for cough and shortness of breath  Cardiovascular: Negative  Gastrointestinal: Negative  Endocrine: Negative  Genitourinary: Negative for discharge, flank pain, hematuria and penile pain  Indwelling urinary catheter   Musculoskeletal: Positive for gait problem  Neurological: Negative for dizziness, syncope, speech difficulty, light-headedness, numbness and headaches  Psychiatric/Behavioral: Negative for agitation, behavioral problems, dysphoric mood, hallucinations, sleep disturbance and suicidal ideas  The patient is not nervous/anxious  All other systems reviewed and are negative      Medications: All medication orders were reviewed in [facility] and compared to SNF EMR  Allergies: NKDA  Consults reviewed:PT, OT, Speech and Other  Labs/Diagnostics (reviewed by this provider): Copy in Chart  Imaging Reviewed:  None today    Physical Exam    Weight:  143 6 lb Temp:  97 2 BP:  119/77 Pulse:  66   Resp: 16 O2 Sat:  98% on room air  Constitutional: Normocephalic  Orientation:Person, Place and Day     Physical Exam  Vitals signs and nursing note reviewed  Constitutional:       General: He is not in acute distress  Appearance: He is not toxic-appearing or diaphoretic  Comments: Elderly male who appears with chronic illness  HENT:      Head: Normocephalic  Nose: No congestion or rhinorrhea  Mouth/Throat:      Mouth: Mucous membranes are moist       Pharynx: No oropharyngeal exudate  Eyes:      General: No scleral icterus  Right eye: No discharge  Left eye: No discharge  Extraocular Movements: Extraocular movements intact  Conjunctiva/sclera: Conjunctivae normal       Pupils: Pupils are equal, round, and reactive to light  Neck:      Musculoskeletal: Neck supple  No neck rigidity  Cardiovascular:      Rate and Rhythm: Normal rate and regular rhythm  Pulses: Normal pulses  Pulmonary:      Effort: Pulmonary effort is normal  No respiratory distress  Breath sounds: Normal breath sounds  No wheezing, rhonchi or rales  Abdominal:      General: Bowel sounds are normal  There is no distension  Palpations: Abdomen is soft  Tenderness: There is no abdominal tenderness  There is no guarding  Genitourinary:     Comments: Indwelling urinary catheter intact and patent for yellow urine  Musculoskeletal:      Right lower leg: No edema  Left lower leg: No edema  Comments: Moves all 4 extremities with generalized weakness  With bradydyskinesia  Lymphadenopathy:      Cervical: No cervical adenopathy  Skin:     General: Skin is warm and dry  Capillary Refill: Capillary refill takes less than 2 seconds  Neurological:      Mental Status: He is alert  Mental status is at baseline  Comments: Speech stuttering  Psychiatric:         Mood and Affect: Mood normal          Behavior: Behavior normal          Thought Content: Thought content normal        Assessment/Plan:  64-year-old male with:    Closed intertrochanteric fracture of right femur with routine healing  -s/p TNF surgery on 03/28/2021 with orthopedic  Had follow-up with [facility] orthopedic on 04/13/2021  -continue PT/OT  -continue fall precautions  -continue aspirin 81 mg b i d  Parkinson's disease ([facility] [address] )  -with early Parkinson's disease related dementia, gait instability, frequent falls, dysphagia  -continue supportive care  -continue rytary, amantadine, ropinirole, exelon patch  -vital signs have been stable  -follow-up with Neurology    Benign prostatic hyperplasia with urinary obstruction  -indwelling urinary catheter placed inpatient due to obstructive uropathy  Nursing has orders from [facility] Urology to change indwelling urinary catheter monthly and p r n   -continue indwelling urinary catheter care as ordered  -continue tamsulosin   -monitor for hypotension and signs of infection since patient is at risk for CAUTI    Acute blood loss anemia  -most recent hemoglobin stable  -recheck CBC with diff on 4/26    PATTI (acute kidney injury) ([facility] [address] )  -in setting of obstructive uropathy and acute blood loss anemia  Naprosyn has been discontinued  Continue to avoid NSAIDs and other nephrotoxins  -baseline creatinine 0 9-1 1  -most recent creatinine 1  13  Recheck BMP on 04/26/2021  -patient appears euvolemic  -ensure adequate hydration    **Please note: This follow-up note was constructed using a voice recognition system  [address] [place] [facility]  8/69/11205:03 PM

## 2021-04-23 NOTE — ASSESSMENT & PLAN NOTE
-with early Parkinson's disease related dementia, gait instability, frequent falls, dysphagia  -continue supportive care  -continue rytary, amantadine, ropinirole, exelon patch  -vital signs have been stable  -follow-up with Neurology

## 2021-04-26 ENCOUNTER — NURSING HOME VISIT (OUTPATIENT)
Dept: GERIATRICS | Facility: OTHER | Age: 71
End: 2021-04-26
Payer: COMMERCIAL

## 2021-04-26 DIAGNOSIS — R63.4 UNINTENTIONAL WEIGHT LOSS: ICD-10-CM

## 2021-04-26 DIAGNOSIS — S72.141D CLOSED DISPLACED INTERTROCHANTERIC FRACTURE OF RIGHT FEMUR WITH ROUTINE HEALING, SUBSEQUENT ENCOUNTER: Primary | ICD-10-CM

## 2021-04-26 DIAGNOSIS — R13.12 OROPHARYNGEAL DYSPHAGIA: ICD-10-CM

## 2021-04-26 DIAGNOSIS — N17.9 AKI (ACUTE KIDNEY INJURY) (HCC): ICD-10-CM

## 2021-04-26 DIAGNOSIS — G20 PARKINSON'S DISEASE (HCC): ICD-10-CM

## 2021-04-26 PROCEDURE — 99309 SBSQ NF CARE MODERATE MDM 30: CPT | Performed by: NURSE PRACTITIONER

## 2021-04-26 NOTE — ASSESSMENT & PLAN NOTE
-s/p TNF on 3/28/2021  Had follow-up with orthopedics on 04/13    Right hip surgical incision line well-healed  -continue PT/OT  -continue fall precautions  -continue aspirin 81 milligrams b i d   -followup with orthopedics on 06/10/2021

## 2021-04-26 NOTE — ASSESSMENT & PLAN NOTE
-in setting of acute and chronic medical conditions  -continue Addy for wound healing  -continue Ensure Plus b i d  with breakfast and dinner  -continue to monitor weights

## 2021-04-26 NOTE — ASSESSMENT & PLAN NOTE
-in setting of Parkinson's disease  -continue modified diet  -continue aspiration precautions  -continue speech therapy

## 2021-04-26 NOTE — PROGRESS NOTES
[facility]   [address] [place] [address], [place], [address]  POS: 31 (STR)  Progress Note    Chief Complaint/Reason for visit: STR follow up  History of Present Illness:  23-year-old male seen and examined for STR follow up  Nursing reports patient had unwitnessed falls on 4/23 and 4/25 without injuries  He informed nursing that he wanted to "practice" walking  Patient recalls falling and states that he will not get up again without assistance  Denies having any pain or discomfort  He is completing at least 50 percent of his meals and nutritional supplements  Denies shortness of breath, chest pain, headache, dizziness, abdominal pain, nausea, vomiting, diarrhea, or constipation  Past Medical History: unchanged from history and physical  Past Medical History:   Diagnosis Date    Arthritis     Disease of thyroid gland     Hyperlipidemia     Hypertension     Parkinson disease ([facility] [address] )      Family History: unchanged from history and physical  Social History: unchanged from history and physical  Resident Since:  04/06/2021  Review of systems: Review of Systems   Constitutional: Negative for appetite change  HENT: Negative  Eyes: Negative  Respiratory: Negative  Negative for cough and shortness of breath  Cardiovascular: Negative  Gastrointestinal: Negative  Endocrine: Negative  Genitourinary: Negative  Musculoskeletal: Positive for gait problem  Skin: Positive for wound  Neurological: Positive for speech difficulty  Negative for dizziness, syncope, light-headedness, numbness and headaches  Psychiatric/Behavioral: Negative for hallucinations  The patient is not nervous/anxious  All other systems reviewed and are negative  Medications: All medication orders were reviewed  Allergies: NKDA  Consults reviewed: Other  Labs/Diagnostics (reviewed by this provider): Copy in Chart    Imaging Reviewed:  None today    Physical Exam    Weight:  139 pounds Temp:  97 7 BP:  106/72  Pulse:  79 Resp: 18   O2 Sat:  94 percent room air  Constitutional: Normocephalic  Orientation:Person and Place with periods of confusion as reported by nursing    Physical Exam  Vitals signs and nursing note reviewed  Constitutional:       General: He is not in acute distress  Appearance: He is not toxic-appearing or diaphoretic  Comments: Elderly male who appears with chronic illness  HENT:      Head: Normocephalic  Nose: No congestion or rhinorrhea  Mouth/Throat:      Mouth: Mucous membranes are moist       Pharynx: No oropharyngeal exudate  Eyes:      General: No scleral icterus  Right eye: No discharge  Left eye: No discharge  Extraocular Movements: Extraocular movements intact  Conjunctiva/sclera: Conjunctivae normal       Pupils: Pupils are equal, round, and reactive to light  Neck:      Musculoskeletal: Neck supple  No neck rigidity  Cardiovascular:      Rate and Rhythm: Normal rate and regular rhythm  Pulses: Normal pulses  Pulmonary:      Effort: Pulmonary effort is normal  No respiratory distress  Breath sounds: Normal breath sounds  No wheezing, rhonchi or rales  Abdominal:      General: Bowel sounds are normal  There is no distension  Palpations: Abdomen is soft  Tenderness: There is no abdominal tenderness  There is no guarding  Musculoskeletal:      Right lower leg: No edema  Left lower leg: No edema  Comments: Moves all 4 extremities  With bradydyskinesia and tremors bilateral upper extremities  Lymphadenopathy:      Cervical: No cervical adenopathy  Skin:     General: Skin is warm and dry  Capillary Refill: Capillary refill takes less than 2 seconds  Comments: Stage III sacral ulcer  Right hip incision line healed with resolving ecchymosis  Neurological:      Mental Status: He is alert  Mental status is at baseline  Motor: Weakness present        Gait: Gait abnormal    Psychiatric:         Mood and Affect: Mood normal          Behavior: Behavior normal          Thought Content: Thought content normal        Assessment/Plan:  68-year-old male with:    Closed intertrochanteric fracture of right femur with routine healing  -s/p TNF on 3/28/2021  Had follow-up with orthopedics on 04/13  Right hip surgical incision line well-healed  -continue PT/OT  -continue fall precautions  -continue aspirin 81 milligrams b i d   -followup with orthopedics on 06/10/2021    Unintentional weight loss  -in setting of acute and chronic medical conditions  -continue Addy for wound healing  -continue Ensure Plus b i d  with breakfast and dinner  -continue to monitor weights    Oropharyngeal dysphagia  -in setting of Parkinson's disease  -continue modified diet  -continue aspiration precautions  -continue speech therapy    PATTI (acute kidney injury) ([facility] [address] )  -in setting of obstructive uropathy and acute blood loss anemia  CBC with diff stable  -baseline creatinine 0 9-1 1  -creatinine has been stable x2 at  with most recent creat 1 09 today  -ensure adequate hydration    Parkinson's disease ([facility] [address] )  -with RLE Parkinson's disease related dementia, gait instability, frequent falls, dysphagia  -continue supportive care at   -continue rytary, amantadine, ropinirole, Exelon patch  -follow-up with Neurology    **Please note: This follow-up note was constructed using a voice recognition system  [address] [place] [facility]  6/95/74527:55 PM

## 2021-04-26 NOTE — ASSESSMENT & PLAN NOTE
-with RLE Parkinson's disease related dementia, gait instability, frequent falls, dysphagia  -continue supportive care at SNF  -continue rytary, amantadine, ropinirole, Exelon patch  -follow-up with Neurology

## 2021-04-26 NOTE — ASSESSMENT & PLAN NOTE
-in setting of obstructive uropathy and acute blood loss anemia    CBC with diff stable  -baseline creatinine 0 9-1 1  -creatinine has been stable x2 at [facility] with most recent creat 1 09 today  -ensure adequate hydration

## 2021-04-28 ENCOUNTER — NURSING HOME VISIT (OUTPATIENT)
Dept: GERIATRICS | Facility: OTHER | Age: 71
End: 2021-04-28
Payer: COMMERCIAL

## 2021-04-28 DIAGNOSIS — S72.141D CLOSED DISPLACED INTERTROCHANTERIC FRACTURE OF RIGHT FEMUR WITH ROUTINE HEALING, SUBSEQUENT ENCOUNTER: Primary | ICD-10-CM

## 2021-04-28 DIAGNOSIS — R26.2 AMBULATORY DYSFUNCTION: ICD-10-CM

## 2021-04-28 DIAGNOSIS — G20 PARKINSON'S DISEASE (HCC): ICD-10-CM

## 2021-04-28 DIAGNOSIS — R53.81 DEBILITY: ICD-10-CM

## 2021-04-28 DIAGNOSIS — L89.153 SACRAL DECUBITUS ULCER, STAGE III (HCC): ICD-10-CM

## 2021-04-28 DIAGNOSIS — R63.4 UNINTENTIONAL WEIGHT LOSS: ICD-10-CM

## 2021-04-28 DIAGNOSIS — G31.84 MCI (MILD COGNITIVE IMPAIRMENT) WITH MEMORY LOSS: ICD-10-CM

## 2021-04-28 DIAGNOSIS — D62 ACUTE BLOOD LOSS ANEMIA: ICD-10-CM

## 2021-04-28 DIAGNOSIS — E03.9 ACQUIRED HYPOTHYROIDISM: ICD-10-CM

## 2021-04-28 DIAGNOSIS — R13.12 OROPHARYNGEAL DYSPHAGIA: ICD-10-CM

## 2021-04-28 DIAGNOSIS — N13.8 BENIGN PROSTATIC HYPERPLASIA WITH URINARY OBSTRUCTION: ICD-10-CM

## 2021-04-28 DIAGNOSIS — N40.1 BENIGN PROSTATIC HYPERPLASIA WITH URINARY OBSTRUCTION: ICD-10-CM

## 2021-04-28 PROBLEM — N17.9 AKI (ACUTE KIDNEY INJURY) (HCC): Status: RESOLVED | Noted: 2021-04-09 | Resolved: 2021-04-28

## 2021-04-28 PROCEDURE — 99316 NF DSCHRG MGMT 30 MIN+: CPT | Performed by: NURSE PRACTITIONER

## 2021-04-28 NOTE — ASSESSMENT & PLAN NOTE
-likely early Parkinson's disease related dementia  -continue Exelon patch  -followup with Neurology

## 2021-04-28 NOTE — ASSESSMENT & PLAN NOTE
-indwelling urinary catheter placed inpatient due to obstructive uropathy  -change indwelling urinary catheter monthly and p r n  as per [facility]  -continue tamsulosin and monitor for hypotension  -monitor for signs and symptoms of infection as patient is at risk for CAUTI

## 2021-04-28 NOTE — ASSESSMENT & PLAN NOTE
-continue sacral ulcer dressing changes with alginate/hydrocolloid dressing every Monday, Wednesday, and Friday  -[facility] to assess wound  -recommend to refer patient to wound center if not healing

## 2021-04-28 NOTE — ASSESSMENT & PLAN NOTE
-in setting of acute and chronic medical conditions  -continue nutritional supplements  -monitor weights at home

## 2021-04-28 NOTE — LETTER
April 28, 2021     Da Bloom DO  [address], [facility]  [address]  [facility] [address]    Patient: Gurwinder Springer   YOB: 1950   Date of Visit: 4/28/2021       Dear Dr Zenia Martinez:    Thank you for referring Palsharee Robins to me for evaluation  Below are my notes for this consultation  If you have questions, please do not hesitate to call me  I look forward to following your patient along with you  Sincerely,        KARLENE Poole        CC: No Recipients  Tad Thomas, [address]  4/28/2021  9:52 AM  Incomplete  ST  [facility]  [address] [facility] [address] [phone]  DISCHARGE SUMMARY  POS: 32 (Q)  Facility:  [facility]    NAME: Gurwinder Springer  AGE: 65 y o  SEX: male  DATE OF ADMISSION:  04/06/2021 DATE OF DISCHARGE:  04/29/2021 DISCHARGE DISPOSITION:  Home    Reason for admission: Patient was admitted from [facility] for rehabilitation after hospitalization for s/p fall, s/p surgical repair, PATTI, BPH with obstructive uropathy, debility    Admission Diagnoses:  Right femur fracture s/p TNF surgery on 03/28/2021; PATTI; Parkinson's disease; acute blood loss anemia; debility  Additional Problems:   Past Medical History:   Diagnosis Date    Arthritis     Disease of thyroid gland     Hyperlipidemia     Hypertension     Parkinson disease ([facility] [address] )      Discharge Diagnoses: See problem list follow up recommendations below  Course of stay: Patient was admitted to [facility] for rehabilitation following hospitalization at [facility]  Nursing reports that patient had unwitnessed falls on 04/23 and 4/25 without injuries  During the resident's stay at [facility], he received skilled nursing care, PT, OT, speech therapy, dietitian support, social service support, and medical management for an overall improvement in his condition  Right hip surgical incision line well-healed    Patient's weight has been fluctuating during SNF stay and he will need to continue nutritional supplements at home with weight monitoring  A referral was placed to [facility] by social service for home health services  Labs and testing performed during stay:  BMP, CBC 4 06/20/2021; stable  Glucose: 85   BUN: 30    creatinine:  1 09    sodium:  140   potassium: 4 3    chloride:  105    carbon dioxide: 27   calcium: 9 0   GFR: 67  Hemoglobin: 11 0    hematocrit: 32 1   WBC: 6 4    platelet count: 644  Discharge Medications: See discharge medication list which was reviewed in [facility] and compared to facility EMR  Status at time of discharge exam: Stable    Today's Visit: 4/28/20219:47 AM    Subjective:  No concerns reported  Review of systems:   Constitutional: Negative for chills and fever  Respiratory: Negative for cough and shortness of breath  Cardiovascular:  Negative for leg swelling  Gastrointestinal:  Negative for abdominal pain, nausea, or vomiting  Genitourinary:  Indwelling urinary catheter present  Musculoskeletal:  Positive for weakness and slowed movements  Skin:  Positive for wound  Neurological:  Negative for dizziness, lightheadedness, headache  Vitals:  Weight:  137 4 lb   BP:  117/76   temp: 97 5°   heart rate: 89   resp: 20   O2 sat:  93% on    Exam: Physical Exam  Vitals signs and nursing note reviewed  Constitutional:       General: He is not in acute distress  Appearance: He is not toxic-appearing or diaphoretic  Comments: Elderly male who appears with chronic illness  HENT:      Head: Normocephalic  Nose: No congestion or rhinorrhea  Mouth/Throat:      Mouth: Mucous membranes are moist       Pharynx: No oropharyngeal exudate  Eyes:      General: No scleral icterus  Right eye: No discharge  Left eye: No discharge  Extraocular Movements: Extraocular movements intact        Conjunctiva/sclera: Conjunctivae normal       Pupils: Pupils are equal, round, and reactive to light  Neck:      Musculoskeletal: Neck supple  No neck rigidity  Cardiovascular:      Rate and Rhythm: Normal rate and regular rhythm  Pulses: Normal pulses  Pulmonary:      Effort: Pulmonary effort is normal  No respiratory distress  Breath sounds: Normal breath sounds  No wheezing, rhonchi or rales  Abdominal:      General: Bowel sounds are normal  There is no distension  Palpations: Abdomen is soft  Tenderness: There is no abdominal tenderness  There is no guarding  Musculoskeletal:      Right lower leg: No edema  Left lower leg: No edema  Comments: Moves all 4 extremities with generalized weakness  Bradykinesia and uncontrolled movements of bilateral upper extremities at times  Lymphadenopathy:      Cervical: No cervical adenopathy  Skin:     General: Skin is warm and dry  Capillary Refill: Capillary refill takes less than 2 seconds  Comments: Right hip surgical incision line well-healed with resolving ecchymoses  Has stage III sacral ulcer and small spine ulcer  Neurological:      Mental Status: He is alert  Mental status is at baseline  Comments: With periods of confusion and forgetfulness  Speech stuttering  Psychiatric:         Mood and Affect: Mood normal          Behavior: Behavior normal          Thought Content: Thought content normal        Discussion with patient/family and further instructions:  -Fall precautions  -Aspiration precautions  -Bleeding precautions  -Monitor for signs/symptoms of infection  -Medication list was reviewed     Follow-up Recommendations: Please follow-up with your primary care physician within 7-10 days of discharge to review medication changes and current status  Problem List Follow-up Recommendations:  10-year-old male with:    Closed intertrochanteric fracture of right femur with routine healing  -s/p TNF on 03/28/2021 with orthopedic  Had follow-up with orthopedics on 04/13/2021  Right hip surgical incision line well-healed with resolving ecchymosis  -continue PT/OT at home to prevent deconditioning and falls  -continue fall precautions  -continue aspirin 81 mg b i d  and checked with orthopedics how long to continue b i d  dosing at next visit  -followup with orthopedics as scheduled on 06/10/2021    Acute blood loss anemia  -most recent hemoglobin 11 0/hematocrit 32 1 on 04/26/2021    MCI (mild cognitive impairment) with memory loss  -likely early Parkinson's disease related dementia  -continue Exelon patch  -follow-up with Neurology    Parkinson's disease [facility])  -with Parkinson's disease related dementia, gait instability, frequent falls, dysphagia  With speech stuttering most likely not related to Parkinson's    Recommend to continue speech therapy  -continues rytary, amantadine, ropinirole, Exelon patch  -follow-up with Neurology    Oropharyngeal dysphagia  -in setting of Parkinson's disease  -continue modified diet  -continue aspiration precautions  -continue speech therapy    Unintentional weight loss  -in setting of acute and chronic medical conditions  -continue nutritional supplements  -monitor weights at home    Acquired hypothyroidism  -continue levothyroxine  -follow up with PCP for management    Benign prostatic hyperplasia with urinary obstruction  -indwelling urinary catheter placed inpatient due to obstructive uropathy  -change indwelling urinary catheter monthly and p r n  as per [facility]  -continue tamsulosin and monitor for hypotension  -monitor for signs and symptoms of infection as patient is at risk for CAUTI    Debility/ambulatory dysfunction  -multifactorial  -continue PT/OT  -continue fall precautions    Sacral decubitus ulcer, stage III ([address] )  -continue sacral ulcer dressing changes with alginate/hydrocolloid dressing every Monday, Wednesday, and Friday  -[facility] to assess wound  -recommend to refer patient to wound center if not healing    PATTI  -resolved    I have spent >30 minutes with pain patient today in which greater than 50% of this time was spent in counseling/coordination of care regarding Intructions for management, Patient and family education and Importance of tx compliance  PCP made aware of discharge summary via epic communications  **Please note: This discharge summary was constructed using a voice recognition system  [address] [place] [facility]  6/69/22824:92 AM

## 2021-04-28 NOTE — ASSESSMENT & PLAN NOTE
-s/p TNF on 03/28/2021 with orthopedic  Had follow-up with orthopedics on 04/13/2021    Right hip surgical incision line well-healed with resolving ecchymosis  -continue PT/OT at home to prevent deconditioning and falls  -continue fall precautions  -continue aspirin 81 mg b i d  and checked with orthopedics how long to continue b i d  dosing at next visit  -followup with orthopedics as scheduled on 06/10/2021

## 2021-04-28 NOTE — ASSESSMENT & PLAN NOTE
-with Parkinson's disease related dementia, gait instability, frequent falls, dysphagia  With speech stuttering most likely not related to Parkinson's    Recommend to continue speech therapy  -continues rytary, amantadine, ropinirole, Exelon patch  -follow-up with Neurology

## 2023-08-11 NOTE — PROGRESS NOTES
[address]  [address]  [phone]  DISCHARGE SUMMARY  POS: 31 (QIF)  Facility:  [facility]    NAME: Juan J Jacob  AGE: 65 y o  SEX: male  DATE OF ADMISSION:  04/06/2021 DATE OF DISCHARGE:  04/29/2021 DISCHARGE DISPOSITION:  Home    Reason for admission: Patient was admitted from [facility] for rehabilitation after hospitalization for s/p fall, s/p surgical repair, PATTI, BPH with obstructive uropathy, debility    Admission Diagnoses:  Right femur fracture s/p TNF surgery on 03/28/2021; PATTI; Parkinson's disease; acute blood loss anemia; debility  Additional Problems:   Past Medical History:   Diagnosis Date    Arthritis     Disease of thyroid gland     Hyperlipidemia     Hypertension     Parkinson disease ([facility] [address] )      Discharge Diagnoses: See problem list follow up recommendations below  Course of stay: Patient was admitted to [facility] for rehabilitation following hospitalization at [facility]  Nursing reports that patient had unwitnessed falls on 04/23 and 4/25 without injuries  During the resident's stay at [facility], he received skilled nursing care, PT, OT, speech therapy, dietitian support, social service support, and medical management for an overall improvement in his condition  Right hip surgical incision line well-healed  Patient's weight has been fluctuating during SNF stay and he will need to continue nutritional supplements at home with weight monitoring  A referral was placed to [facility] by social service for home health services      Labs and testing performed during stay:  BMP, CBC 4 06/20/2021; stable  Glucose: 85   BUN: 30    creatinine:  1 09    sodium:  140   potassium: 4 3    chloride:  105    carbon dioxide: 27   calcium: 9 0   GFR: 67  Hemoglobin: 11 0    hematocrit: 32 1   WBC: 6 4    platelet count: 988  Discharge Medications: See discharge medication list which was reviewed in [facility] and compared to facility EMR  Status at time of discharge exam: Stable    Today's Visit: 4/28/20219:47 AM    Subjective:  No concerns reported  Review of systems:   Constitutional: Negative for chills and fever  Respiratory: Negative for cough and shortness of breath  Cardiovascular:  Negative for leg swelling  Gastrointestinal:  Negative for abdominal pain, nausea, or vomiting  Genitourinary:  Indwelling urinary catheter present  Musculoskeletal:  Positive for weakness and slowed movements  Skin:  Positive for wound  Neurological:  Negative for dizziness, lightheadedness, headache  Vitals:  Weight:  137 4 lb   BP:  117/76   temp: 97 5°   heart rate: 89   resp: 20   O2 sat:  93% on    Exam: Physical Exam  Vitals signs and nursing note reviewed  Constitutional:       General: He is not in acute distress  Appearance: He is not toxic-appearing or diaphoretic  Comments: Elderly male who appears with chronic illness  HENT:      Head: Normocephalic  Nose: No congestion or rhinorrhea  Mouth/Throat:      Mouth: Mucous membranes are moist       Pharynx: No oropharyngeal exudate  Eyes:      General: No scleral icterus  Right eye: No discharge  Left eye: No discharge  Extraocular Movements: Extraocular movements intact  Conjunctiva/sclera: Conjunctivae normal       Pupils: Pupils are equal, round, and reactive to light  Neck:      Musculoskeletal: Neck supple  No neck rigidity  Cardiovascular:      Rate and Rhythm: Normal rate and regular rhythm  Pulses: Normal pulses  Pulmonary:      Effort: Pulmonary effort is normal  No respiratory distress  Breath sounds: Normal breath sounds  No wheezing, rhonchi or rales  Abdominal:      General: Bowel sounds are normal  There is no distension  Palpations: Abdomen is soft  Tenderness: There is no abdominal tenderness  There is no guarding     Musculoskeletal:      Right lower leg: No edema  Left lower leg: No edema  Comments: Moves all 4 extremities with generalized weakness  Bradykinesia and uncontrolled movements of bilateral upper extremities at times  Lymphadenopathy:      Cervical: No cervical adenopathy  Skin:     General: Skin is warm and dry  Capillary Refill: Capillary refill takes less than 2 seconds  Comments: Right hip surgical incision line well-healed with resolving ecchymoses  Has stage III sacral ulcer and small spine ulcer  Neurological:      Mental Status: He is alert  Mental status is at baseline  Comments: With periods of confusion and forgetfulness  Speech stuttering  Psychiatric:         Mood and Affect: Mood normal          Behavior: Behavior normal          Thought Content: Thought content normal        Discussion with patient/family and further instructions:  -Fall precautions  -Aspiration precautions  -Bleeding precautions  -Monitor for signs/symptoms of infection  -Medication list was reviewed     Follow-up Recommendations: Please follow-up with your primary care physician within 7-10 days of discharge to review medication changes and current status  Problem List Follow-up Recommendations:  68-year-old male with:    Closed intertrochanteric fracture of right femur with routine healing  -s/p TNF on 03/28/2021 with orthopedic  Had follow-up with orthopedics on 04/13/2021    Right hip surgical incision line well-healed with resolving ecchymosis  -continue PT/OT at home to prevent deconditioning and falls  -continue fall precautions  -continue aspirin 81 mg b i d  and checked with orthopedics how long to continue b i d  dosing at next visit  -followup with orthopedics as scheduled on 06/10/2021    Acute blood loss anemia  -most recent hemoglobin 11 0/hematocrit 32 1 on 04/26/2021    MCI (mild cognitive impairment) with memory loss  -likely early Parkinson's disease related dementia  -continue Exelon patch  -follow-up with Neurology    Parkinson's disease [facility])  -with Parkinson's disease related dementia, gait instability, frequent falls, dysphagia  With speech stuttering most likely not related to Parkinson's  Recommend to continue speech therapy  -continues rytary, amantadine, ropinirole, Exelon patch  -follow-up with Neurology    Oropharyngeal dysphagia  -in setting of Parkinson's disease  -continue modified diet  -continue aspiration precautions  -continue speech therapy    Unintentional weight loss  -in setting of acute and chronic medical conditions  -continue nutritional supplements  -monitor weights at home    Acquired hypothyroidism  -continue levothyroxine  -follow up with PCP for management    Benign prostatic hyperplasia with urinary obstruction  -indwelling urinary catheter placed inpatient due to obstructive uropathy  -change indwelling urinary catheter monthly and p r n  as per [facility]  -continue tamsulosin and monitor for hypotension  -monitor for signs and symptoms of infection as patient is at risk for CAUTI    Debility/ambulatory dysfunction  -multifactorial  -continue PT/OT  -continue fall precautions    Sacral decubitus ulcer, stage III ([address] )  -continue sacral ulcer dressing changes with alginate/hydrocolloid dressing every Monday, Wednesday, and Friday  -[facility] to assess wound  -recommend to refer patient to wound center if not healing    PATTI  -resolved    I have spent >30 minutes with pain patient today in which greater than 50% of this time was spent in counseling/coordination of care regarding Intructions for management, Patient and family education and Importance of tx compliance  PCP made aware of discharge summary via epic communications  **Please note: This discharge summary was constructed using a voice recognition system  [address] [place] [facility]  8/65/57849:78 AM good balance